# Patient Record
Sex: MALE | Race: BLACK OR AFRICAN AMERICAN | URBAN - METROPOLITAN AREA
[De-identification: names, ages, dates, MRNs, and addresses within clinical notes are randomized per-mention and may not be internally consistent; named-entity substitution may affect disease eponyms.]

---

## 2019-06-30 ENCOUNTER — HOSPITAL ENCOUNTER (OUTPATIENT)
Dept: MEDSURG UNIT | Facility: HOSPITAL | Age: 44
End: 2019-07-01
Attending: INTERNAL MEDICINE | Admitting: INTERNAL MEDICINE

## 2019-06-30 LAB
ABS NEUT (OLG): 10.31 X10(3)/MCL (ref 2.1–9.2)
ALBUMIN SERPL-MCNC: 3.1 GM/DL (ref 3.4–5)
ALBUMIN/GLOB SERPL: 0.6 RATIO (ref 1.1–2)
ALP SERPL-CCNC: 124 UNIT/L (ref 45–117)
ALT SERPL-CCNC: 77 UNIT/L (ref 12–78)
AST SERPL-CCNC: 66 UNIT/L (ref 15–37)
BASOPHILS # BLD AUTO: 0.02 X10(3)/MCL
BASOPHILS NFR BLD AUTO: 0 %
BILIRUB SERPL-MCNC: 0.8 MG/DL (ref 0.2–1)
BILIRUBIN DIRECT+TOT PNL SERPL-MCNC: 0.3 MG/DL
BILIRUBIN DIRECT+TOT PNL SERPL-MCNC: 0.5 MG/DL
BUN SERPL-MCNC: 7 MG/DL (ref 7–18)
CALCIUM SERPL-MCNC: 9.9 MG/DL (ref 8.5–10.1)
CHLORIDE SERPL-SCNC: 94 MMOL/L (ref 98–107)
CO2 SERPL-SCNC: 28 MMOL/L (ref 21–32)
CREAT SERPL-MCNC: 0.8 MG/DL (ref 0.6–1.3)
EOSINOPHIL # BLD AUTO: 0.16 X10(3)/MCL
EOSINOPHIL NFR BLD AUTO: 1 %
ERYTHROCYTE [DISTWIDTH] IN BLOOD BY AUTOMATED COUNT: 12 % (ref 11.5–14.5)
GLOBULIN SER-MCNC: 5.5 GM/ML (ref 2.3–3.5)
GLUCOSE SERPL-MCNC: 114 MG/DL (ref 74–106)
GROUP & RH: NORMAL
HCT VFR BLD AUTO: 41.2 % (ref 40–51)
HGB BLD-MCNC: 14 GM/DL (ref 13.5–17.5)
IMM GRANULOCYTES # BLD AUTO: 0.04 10*3/UL
IMM GRANULOCYTES NFR BLD AUTO: 0 %
LYMPHOCYTES # BLD AUTO: 1.07 X10(3)/MCL
LYMPHOCYTES NFR BLD AUTO: 8 % (ref 13–40)
MAGNESIUM SERPL-MCNC: 2 MG/DL (ref 1.6–2.6)
MCH RBC QN AUTO: 34.1 PG (ref 26–34)
MCHC RBC AUTO-ENTMCNC: 34 GM/DL (ref 31–37)
MCV RBC AUTO: 100.5 FL (ref 80–100)
MONOCYTES # BLD AUTO: 1.01 X10(3)/MCL
MONOCYTES NFR BLD AUTO: 8 % (ref 4–12)
NEUTROPHILS # BLD AUTO: 10.31 X10(3)/MCL
NEUTROPHILS NFR BLD AUTO: 82 X10(3)/MCL
PLATELET # BLD AUTO: 263 X10(3)/MCL (ref 130–400)
PMV BLD AUTO: 10.7 FL (ref 7.4–10.4)
POTASSIUM SERPL-SCNC: 3.1 MMOL/L (ref 3.5–5.1)
PROT SERPL-MCNC: 8.6 GM/DL (ref 6.4–8.2)
RBC # BLD AUTO: 4.1 X10(6)/MCL (ref 4.5–5.9)
SODIUM SERPL-SCNC: 130 MMOL/L (ref 136–145)
WBC # SPEC AUTO: 12.6 X10(3)/MCL (ref 4.5–11)

## 2019-07-01 LAB
ABS NEUT (OLG): 11.9 X10(3)/MCL (ref 2.1–9.2)
ALBUMIN SERPL-MCNC: 3.3 GM/DL (ref 3.4–5)
ALBUMIN/GLOB SERPL: 0.6 RATIO (ref 1.1–2)
ALP SERPL-CCNC: 140 UNIT/L (ref 45–117)
ALT SERPL-CCNC: 92 UNIT/L (ref 12–78)
AST SERPL-CCNC: 100 UNIT/L (ref 15–37)
BASOPHILS # BLD AUTO: 0.01 X10(3)/MCL
BASOPHILS NFR BLD AUTO: 0 %
BILIRUB SERPL-MCNC: 0.5 MG/DL (ref 0.2–1)
BILIRUBIN DIRECT+TOT PNL SERPL-MCNC: 0.2 MG/DL
BILIRUBIN DIRECT+TOT PNL SERPL-MCNC: 0.3 MG/DL
BUN SERPL-MCNC: 6 MG/DL (ref 7–18)
CALCIUM SERPL-MCNC: 10.1 MG/DL (ref 8.5–10.1)
CHLORIDE SERPL-SCNC: 97 MMOL/L (ref 98–107)
CO2 SERPL-SCNC: 27 MMOL/L (ref 21–32)
CREAT SERPL-MCNC: 0.8 MG/DL (ref 0.6–1.3)
EOSINOPHIL # BLD AUTO: 0.01 10*3/UL
EOSINOPHIL NFR BLD AUTO: 0 %
ERYTHROCYTE [DISTWIDTH] IN BLOOD BY AUTOMATED COUNT: 12.1 % (ref 11.5–14.5)
GLOBULIN SER-MCNC: 5.4 GM/ML (ref 2.3–3.5)
GLUCOSE SERPL-MCNC: 144 MG/DL (ref 74–106)
HCT VFR BLD AUTO: 37.4 % (ref 40–51)
HGB BLD-MCNC: 12.6 GM/DL (ref 13.5–17.5)
IMM GRANULOCYTES # BLD AUTO: 0.07 10*3/UL
IMM GRANULOCYTES NFR BLD AUTO: 0 %
LYMPHOCYTES # BLD AUTO: 0.96 X10(3)/MCL
LYMPHOCYTES NFR BLD AUTO: 7 % (ref 13–40)
MCH RBC QN AUTO: 33.9 PG (ref 26–34)
MCHC RBC AUTO-ENTMCNC: 33.7 GM/DL (ref 31–37)
MCV RBC AUTO: 100.5 FL (ref 80–100)
MONOCYTES # BLD AUTO: 1 X10(3)/MCL
MONOCYTES NFR BLD AUTO: 7 % (ref 4–12)
NEUTROPHILS # BLD AUTO: 11.9 X10(3)/MCL
NEUTROPHILS NFR BLD AUTO: 85 X10(3)/MCL
PLATELET # BLD AUTO: 396 X10(3)/MCL (ref 130–400)
PMV BLD AUTO: 9.5 FL (ref 7.4–10.4)
POTASSIUM SERPL-SCNC: 4 MMOL/L (ref 3.5–5.1)
PROT SERPL-MCNC: 8.7 GM/DL (ref 6.4–8.2)
RBC # BLD AUTO: 3.72 X10(6)/MCL (ref 4.5–5.9)
SODIUM SERPL-SCNC: 132 MMOL/L (ref 136–145)
WBC # SPEC AUTO: 14 X10(3)/MCL (ref 4.5–11)

## 2022-04-30 NOTE — H&P
Patient:   Demetri Villalobos             MRN: [id number]            FIN: [id number]               Age:   43 years     Sex:  Male     :  1975   Associated Diagnoses:   None   Author:   Saturnino Vogel MD      Chief Complaint   2019 13:04 CDT      started lisinopril and amlodipine today, took a nap, woke up approx 11am, lower lip swelling no trouble breathing. no tongue swelling. NAD.      History of Present Illness   43-year-old male with past medical history of hypertension and recent hospitalization for first episode of acute alcoholic pancreatitis who was discharged home yesterday.  Was discharged on Bentyl, amlodipine 10 mg, lisinopril 40 mg, metoprolol succinate 50 mg.  Patient reports that he was asymptomatic and doing well this AM.  Took the amlodipine and lisinopril this AM.  Took a nap thereafter and woke up with significant lower lip swelling around 1100.  Called the hospital and was told to report to the ED for further evaluation.  In the ED noted to have angioedema of the lower lip and face.  Received Benadryl 50 mg IV, epinephrine 0.3 mg IM, Solu-Medrol 125 mg IV, and 1 L bolus of normal saline, with continued progression of his lip swelling.  Then received 2 units of FFP with stabilization and improvement of the swelling.  Medicine on-call was consulted for admission.      Review of Systems   Constitutional:  No fever, No chills, No fatigue.    Eye:  No recent visual problem, No blurring, No double vision.    Ear/Nose/Mouth/Throat:  No nasal congestion, No sore throat, No tinnitus.    Respiratory:  No shortness of breath, No cough, No wheezing.    Cardiovascular:  No chest pain, No palpitations, No peripheral edema, No syncope.    Gastrointestinal:  No nausea, No vomiting, No diarrhea, No constipation.    Genitourinary:  No dysuria, No hematuria.    Musculoskeletal:  No joint pain, No muscle pain.    Integumentary:  No rash, No skin lesion.    Neurologic:  No numbness, No tingling, No  headache.       Health Status   PMHx: Hypertension, alcoholic pancreatitis  Surgical Hx: Right fifth digit cyst excision  Family Hx: Reports both parents living and healthy.  Denies family history of cancers.  Social Hx: 1/3 pack/day smoker since age 20; previously drank 1 pint vodka 3-4 times per week; denies illicit drug use      Allergies: NKDA   Current medications:   Amlodipine 10 mg daily  Lisinopril 40 mg daily  Metoprolol succinate 50 mg daily  Bentyl 10 mg 4 times daily      Physical Examination      Vital Signs (last 24 hrs)_____  Last Charted___________  Temp Oral     37.0 DegC  (JUN 30 16:03)  Heart Rate Peripheral   H 116bpm  (JUN 30 14:00)  Resp Rate         20   (JUN 30 16:03)  SBP      H 167  (JUN 30 16:03)  DBP      H 115  (JUN 30 16:03)  SpO2      97 %  (JUN 30 16:03)  Weight      59.05 kg  (JUN 30 13:04)     GENERAL: Sitting comfortably in bed in no apparent distress  EYES: EOMI, PERRL, normal conjunctivae  HENT: Oral mucosa moist, Mallampati IV, moderate angioedema of the lower lip and buccal mucosa; no visible pharyngeal edema  NECK: Supple, no JVD, no lymphadenopathy  CHEST: CTAB, nonlabored respirations  CARDIOVASCULAR:  Regular rate and rhythm, S1, S2, grade II/VI systolic ejection murmur left upper sternal border, no carotid bruits  ABDOMEN:  Soft, nontender, nondistended, normal bowel sounds  EXTREMITIES: No clubbing, cyanosis, or edema.  Peripheral pulses normal and equal in all extremities.  NEURO: Awake, alert, oriented, no focal deficits  SKIN: Warm, dry, intact, no rashes      Review / Management   Laboratory Results   Today's Lab Results : [facility] Discrete Results   6/30/2019 13:25 CDT      WBC                       12.6 x10(3)/mcL  HI                             RBC                       4.10 x10(6)/mcL  LOW                             Hgb                       14.0 gm/dL                             Hct                       41.2 %                             Platelet                   263 x10(3)/mcL                             MCV                       100.5 fL  HI                             MCH                       34.1 pg  HI                             MCHC                      34.0 gm/dL                             RDW                       12.0 %                             MPV                       10.7 fL  HI                             Abs Neut                  10.31 x10(3)/mcL  HI                             Neutro Auto               82 x10(3)/mcL  NA                             Lymph Auto                8 %  LOW                             Mono Auto                 8 %                             Eos Auto                  1  NA                             Abs Eos                   0.16 x10(3)/mcL  NA                             Basophil Auto             0  NA                             Abs Neutro                10.31 x10(3)/mcL  NA                             Abs Lymph                 1.07 x10(3)/mcL  NA                             Abs Mono                  1.01 x10(3)/mcL  NA                             Abs Baso                  0.02 x10(3)/mcL  NA                             IG%                       0 %  NA                             IG#                       0.0400  NA                             Sodium Lvl                130 mmol/L  LOW                             Potassium Lvl             3.1 mmol/L  LOW                             Chloride                  94 mmol/L  LOW                             CO2                       28 mmol/L                             Calcium Lvl               9.9 mg/dL                             Glucose Lvl               114 mg/dL  HI                             BUN                       7 mg/dL                             Creatinine                0.80 mg/dL                             eGFR-AA                   >105 mL/min                             eGFR-CHANTALE                  >105 mL/min                             Bili Total                 0.8 mg/dL                             Bili Direct               0.3 mg/dL                             Bili Indirect             0.5 mg/dL                             AST                       66 unit/L  HI                             ALT                       77 unit/L                             Alk Phos                  124 unit/L  HI                             Total Protein             8.6 gm/dL  HI                             Albumin Lvl               3.1 gm/dL  LOW                             Globulin                  5.50 gm/mL  HI                             A/G Ratio                 0.6 ratio  LOW        Radiology results   Rad Results (ST)   Accession: [id number]  Order: XR Chest 2 Views  Report Dt/Tm: 06/30/2019 14:02  Report:      History:  Dyspnea     Reference:  No priors     Findings:  Frontal and lateral views of the chest were obtained. Heart and  mediastinum within normal limits. The lungs are clear. No pneumothorax  or significant effusion.     Impression:   No acute cardiopulmonary abnormality.      Impression and Plan   Acute ACEi induced angioedema of the lower lip  Uncontrolled hypertension  Hypokalemia  Systolic heart murmur  Recent acute alcoholic pancreatitis    Plan:  -Status post FFP, improving, continue close monitoring on telemetry; low threshold for intubation  -NPO for now, will control BP with IV PRN labetalol  -Adding ACE allergy to his chart  -Replete potassium, check mag level  -Echocardiogram in AM for newly noted cardiac murmur  -Hypertension is chronic, tachycardia appears chronic as well and likely worsened due to epinephrine; no suspicion for acute alcohol withdrawals    DVT ppx: SCDs  Code status: full code    Dispo: Admit observation to telemetry overnight.  Close monitoring of his airway.  No ACE in the future.  Counseled on risk of recurrence in the next few weeks even without ACE.

## 2022-04-30 NOTE — ED PROVIDER NOTES
Patient:   Demetri Villalobos             MRN: [id number]            FIN: [id number]               Age:   43 years     Sex:  Male     :  1975   Associated Diagnoses:   Angioedema; Allergy to lisinopril   Author:   Sj Guerra MD      Basic Information   Time seen: Date & time 2019 13:25:00.   History source: Patient.   Arrival mode: Private vehicle.   History limitation: None.   Additional information: Chief Complaint from Nursing Triage Note : Chief Complaint   2019 13:04 CDT      Chief Complaint           started lisinopril and amlodipine today, took a nap, woke up approx 11am, lower lip swelling no trouble breathing. no tongue swelling. NAD.  .   Provider/Visit info:   Time Seen:  Sj Guerra MD / 2019 13:19  .   History of Present Illness   The patient presents with face swelling.  The onset was just prior to arrival.  The course/duration of symptoms is worsening.  Location: lips. The character of symptoms is swelling.  The degree at onset was minimal.  The degree at present is moderate.  Risk factors consist of recent medication change and started lisinopril today.  Prior episodes: none.  Associated symptoms: swelling, denies dyspnea, denies wheezing and denies difficulty swallowing.        Review of Systems   Constitutional symptoms:  No fever, no chills, no sweats.    Skin symptoms:  No rash,    Eye symptoms:  No recent vision problems,    ENMT symptoms:  Negative except as documented in HPI.   Respiratory symptoms:  No shortness of breath, no cough.    Cardiovascular symptoms:  No chest pain, no tachycardia.    Gastrointestinal symptoms:  No abdominal pain, no nausea, no vomiting.    Genitourinary symptoms:  No dysuria,    Musculoskeletal symptoms:  No back pain, no Muscle pain.    Neurologic symptoms:  No headache, no dizziness.              Additional review of systems information: All other systems reviewed and otherwise negative.      Health Status   Allergies:     Allergic Reactions (Selected)  No Known Medication Allergies,    Allergies (1) Active Reaction  No Known Medication Allergies None Documented  .   Medications:  (Selected)   Inpatient Medications  Ordered  Benadryl (for Inj.): 50 mg, form: Injection, IV Push, Once-chemo, first dose 06/30/19 13:23:00 CDT, stop date 06/30/19 13:23:00 CDT, STAT  EPINEPHrine 0.1 mg/mL injectable solution: 0.3 mg, form: Injection, IM, Once, first dose 06/30/19 13:23:00 CDT, stop date 06/30/19 13:23:00 CDT, STAT  Normal Saline Infusion: 1,000 mL, 1,000 mL, IV, 2000 mL/hr, start date 06/30/19 13:25:00 CDT, stop date 06/30/19 13:25:00 CDT, STAT  Solumedrol IV push / IM: 125 mg, form: Injection, IV Push, Once, first dose 06/30/19 13:24:00 CDT, stop date 06/30/19 13:24:00 CDT, STAT  morphine 2 mg/mL injectable solution: 4 mg, form: Soln, IV, q3hr PRN for pain, severe, first dose 06/30/19 13:25:00 CDT, STAT  Prescriptions  Prescribed  Bentyl 10 mg oral capsule: 10 mg = 1 cap(s), Oral, QID, # 40 cap(s), 0 Refill(s)  Norvasc 10 mg oral tablet: 10 mg = 1 tab(s), Oral, Daily, # 90 tab(s), 0 Refill(s)  lisinopril 40 mg oral tablet: 40 mg = 1 tab(s), Oral, Daily, # 90 tab(s), 0 Refill(s)  metoprolol succinate 50 mg oral tablet, extended release: 50 mg = 1 tab(s), Oral, Daily, # 90 tab(s), 0 Refill(s).      Past Medical/ Family/ Social History   Medical history:    No active or resolved past medical history items have been selected or recorded..   Surgical history:    Finger injury ([id number])..   Family history:    No family history items have been selected or recorded..      Physical Examination               Vital Signs   Vital Signs   6/30/2019 13:04 CDT      Temperature Oral          36.8 DegC                             Temperature Oral (calculated)             98.24 DegF                             Peripheral Pulse Rate     108 bpm  HI                             Respiratory Rate          18 br/min                             SpO2                       100 %                             Systolic Blood Pressure   149 mmHg  HI                             Diastolic Blood Pressure  104 mmHg  HI  .   General:  Alert, no acute distress.    Skin:  Warm, dry.    Head:  Normocephalic.   Neck:  Supple, trachea midline, no tenderness, no JVD.    Eye:  Pupils are equal, round and reactive to light, extraocular movements are intact.    Ears, nose, mouth and throat:  Oral mucosa moist, no pharyngeal erythema or exudate, bilateral severe lower lip swelling..    Cardiovascular:  Regular rate and rhythm, No murmur, Normal peripheral perfusion, No edema.    Respiratory:  Lungs are clear to auscultation, respirations are non-labored, breath sounds are equal, Symmetrical chest wall expansion.    Gastrointestinal:  Soft, Nontender, Non distended, Normal bowel sounds, No organomegaly.    Back:  Nontender, Normal range of motion.    Musculoskeletal:  Normal ROM, normal strength, no tenderness, no swelling, no deformity.    Neurological:  Alert and oriented to person, place, time, and situation, No focal neurological deficit observed, CN II-XII intact, normal sensory observed, normal motor observed, normal speech observed, normal coordination observed.    Psychiatric:  Cooperative.      Medical Decision Making   Differential Diagnosis:  Angioedema.   Documents reviewed:  Emergency department nurses' notes.   Results review:  Lab results : Lab View   6/30/2019 14:06 CDT      Product Ready             2 FFP ready    6/30/2019 13:50 CDT      ABO/Rh                    B POS                             ABSC Auto Intrp           Neg    6/30/2019 13:25 CDT      Sodium Lvl                130 mmol/L  LOW                             Potassium Lvl             3.1 mmol/L  LOW                             Chloride                  94 mmol/L  LOW                             CO2                       28 mmol/L                             Calcium Lvl               9.9 mg/dL                              Glucose Lvl               114 mg/dL  HI                             BUN                       7 mg/dL                             Creatinine                0.80 mg/dL                             eGFR-AA                   >105 mL/min                             eGFR-CHANTALE                  >105 mL/min                             Bili Total                0.8 mg/dL                             Bili Direct               0.3 mg/dL                             Bili Indirect             0.5 mg/dL                             AST                       66 unit/L  HI                             ALT                       77 unit/L                             Alk Phos                  124 unit/L  HI                             Total Protein             8.6 gm/dL  HI                             Albumin Lvl               3.1 gm/dL  LOW                             Globulin                  5.50 gm/mL  HI                             A/G Ratio                 0.6 ratio  LOW                             WBC                       12.6 x10(3)/mcL  HI                             RBC                       4.10 x10(6)/mcL  LOW                             Hgb                       14.0 gm/dL                             Hct                       41.2 %                             Platelet                  263 x10(3)/mcL                             MCV                       100.5 fL  HI                             MCH                       34.1 pg  HI                             MCHC                      34.0 gm/dL                             RDW                       12.0 %                             MPV                       10.7 fL  HI                             Abs Neut                  10.31 x10(3)/mcL  HI                             Neutro Auto               82 x10(3)/mcL  NA                             Lymph Auto                8 %  LOW                             Mono Auto                 8 %                             Eos Auto                   1  NA                             Abs Eos                   0.16 x10(3)/mcL  NA                             Basophil Auto             0  NA                             Abs Neutro                10.31 x10(3)/mcL  NA                             Abs Lymph                 1.07 x10(3)/mcL  NA                             Abs Mono                  1.01 x10(3)/mcL  NA                             Abs Baso                  0.02 x10(3)/mcL  NA                             IG%                       0 %  NA                             IG#                       0.0400  NA    6/29/2019 3:42 CDT       Sodium Lvl                133 mmol/L  LOW                             Potassium Lvl             3.9 mmol/L                             Chloride                  98 mmol/L                             CO2                       29 mmol/L                             Calcium Lvl               8.9 mg/dL                             Glucose Lvl               98 mg/dL                             BUN                       5 mg/dL  LOW                             Creatinine                0.80 mg/dL                             eGFR-AA                   >105 mL/min                             eGFR-CHANTALE                  >105 mL/min                             Bili Total                0.8 mg/dL                             Bili Direct               0.4 mg/dL  HI                             Bili Indirect             0.4 mg/dL                             AST                       92 unit/L  HI                             ALT                       76 unit/L                             Alk Phos                  112 unit/L                             Total Protein             6.8 gm/dL                             Albumin Lvl               2.5 gm/dL  LOW                             Globulin                  4.30 gm/mL  HI                             A/G Ratio                 0.6 ratio  LOW                             WBC                        13.8 x10(3)/mcL  HI                             RBC                       3.82 x10(6)/mcL  LOW                             Hgb                       12.9 gm/dL  LOW                             Hct                       38.2 %  LOW                             Platelet                  242 x10(3)/mcL                             MCV                       100.0 fL                             MCH                       33.8 pg                             MCHC                      33.8 gm/dL                             RDW                       12.9 %                             MPV                       9.9 fL                             Abs Neut                  11.65 x10(3)/mcL  HI                             Neutro Auto               84 x10(3)/mcL  NA                             Lymph Auto                7 %  LOW                             Mono Auto                 8 %                             Eos Auto                  0  NA                             Abs Eos                   0.07  NA                             Basophil Auto             0  NA                             Abs Neutro                11.65 x10(3)/mcL  NA                             Abs Lymph                 0.99 x10(3)/mcL  NA                             Abs Mono                  1.03 x10(3)/mcL  NA                             Abs Baso                  0.02 x10(3)/mcL  NA                             IG%                       0 %  NA                             IG#                       0.0500  NA    .      Reexamination/ Reevaluation   Vital signs   results included from flowsheet : Vital Signs   6/30/2019 14:53 CDT      Temperature Oral          37.2 DegC                             Temperature Oral (calculated)             98.96 DegF                             Heart Rate Monitored      110  HI                             Respiratory Rate          20                             SpO2                      98 %                             Saturation  Probe Site     Hand, right                             Oxygen Therapy            Room air                             Systolic Blood Pressure   157  HI                             Diastolic Blood Pressure  109  HI                             Mean Arterial Pressure, Cuff              125 mmHg                             Blood Pressure Location   Left arm    6/30/2019 14:49 CDT      Temperature Oral          37.2 DegC                             Temperature Oral (calculated)             98.96 DegF                             Heart Rate Monitored      110  HI                             Respiratory Rate          20                             SpO2                      100 %                             Saturation Probe Site     Hand, right                             Oxygen Therapy            Room air                             Systolic Blood Pressure   157  HI                             Diastolic Blood Pressure  109  HI                             Mean Arterial Pressure, Cuff              125 mmHg                             Blood Pressure Location   Left arm    6/30/2019 14:00 CDT      Peripheral Pulse Rate     116 bpm  HI                             Heart Rate Monitored      119 bpm  HI                             Respiratory Rate          20 br/min                             SpO2                      100 %                             Saturation Probe Site     Hand, right                             Oxygen Therapy            Room air                             Systolic Blood Pressure   162 mmHg  HI                             Diastolic Blood Pressure  111 mmHg  HI                             Mean Arterial Pressure, Cuff              128 mmHg                             Blood Pressure Location   Left arm    6/30/2019 13:40 CDT      Peripheral Pulse Rate     121 bpm  HI                             Heart Rate Monitored      121 bpm  HI                             Respiratory Rate          21 br/min                              SpO2                      98 %                             Saturation Probe Site     Hand, right                             Oxygen Therapy            Room air                             Systolic Blood Pressure   152 mmHg  HI                             Diastolic Blood Pressure  113 mmHg  HI                             Mean Arterial Pressure, Cuff              126 mmHg                             Blood Pressure Location   Left arm    6/30/2019 13:30 CDT      Peripheral Pulse Rate     99 bpm                             Respiratory Rate          18 br/min                             SpO2                      100 %                             Saturation Probe Site     Hand, right                             Oxygen Therapy            Room air                             Systolic Blood Pressure   160 mmHg  HI                             Diastolic Blood Pressure  121 mmHg  HI                             Mean Arterial Pressure, Cuff              134 mmHg                             Blood Pressure Location   Left arm    6/30/2019 13:16 CDT      Peripheral Pulse Rate     110 bpm  HI                             Respiratory Rate          20 br/min                             SpO2                      100 %                             Saturation Probe Site     Hand, right                             Oxygen Therapy            Room air                             Systolic Blood Pressure   140 mmHg                             Diastolic Blood Pressure  112 mmHg  HI                             Mean Arterial Pressure, Cuff              121 mmHg                             Blood Pressure Location   Left arm    6/30/2019 13:04 CDT      Temperature Oral          36.8 DegC                             Temperature Oral (calculated)             98.24 DegF                             Peripheral Pulse Rate     108 bpm  HI                             Respiratory Rate          18 br/min                             SpO2                       100 %                             Systolic Blood Pressure   149 mmHg  HI                             Diastolic Blood Pressure  104 mmHg  HI     Notes: no response to epi, benadryl, solumedrol, increase lower lip swelling, no involvment of tongue, palate or pharynx.  will give FFP due to increased swelling, admit for obs..      Procedure   Critical care note   Total time: 30 minutes spent engaged in work directly related to patient care and/ or available for direct patient care.   Critical condition(s) addressed for impending deterioration include: respiratory.   Associated risk factors: angioedema.   Management: bedside assessment, supervision of care, Interpretation (electrocardiogram, blood pressure), Interventions plasma transfusion.   Performed by: self.      Impression and Plan   Diagnosis   Angioedema ([id number] T78.3XXA)   Allergy to lisinopril ([id number] Z88.8)      Calls-Consults   -  6/30/2019 15:01:00 , Van PEREZ, Leno ROSALES, IM, consult.    Plan   Condition: Guarded.    Disposition: Admit to Inpatient Telemetry Unit.

## 2022-05-04 NOTE — HISTORICAL [facility]ER
This is a historical note converted from Jv. Formatting and pictures may have been removed.  Please reference Jv for original formatting and attached multimedia. Admit and Discharge Dates  Admit Date: 06/30/2019  Discharge Date: 07/01/2019  Physicians  Attending Physician - Narciso PEREZ, Sherry FUENTES  Admitting Physician - Narciso PEREZ, Sherry FUENTES  Primary Care Physician - No PCP, No  Discharge Diagnosis  Allergy to lisinopril?Z88.8  ?  Angioedema?T78.3XXA  ?  Hypertension?I10  ?  Lip swelling?[id number]  ?  Surgical Procedures  No procedures recorded for this visit.  Immunizations  No immunizations recorded for this visit.  Hospital Course  43-year-old male with past medical history of hypertension and recent hospitalization for first episode of acute alcoholic pancreatitis who was discharged home 6/29. ?Was discharged on Bentyl, amlodipine 10 mg, lisinopril 40 mg, metoprolol succinate 50 mg. ?Patient reports that he was asymptomatic and doing well 6/30 am. ?Took the amlodipine and lisinopril that AM. ?Took a nap thereafter and woke up with significant lower lip swelling around 1100. ?Called the hospital and was told to report to the ED for further evaluation. ?In the ED noted to have angioedema of the lower lip and face. ?Received Benadryl 50 mg IV, epinephrine 0.3 mg IM, Solu-Medrol 125 mg IV, and 1 L bolus of normal saline, with continued progression of his lip swelling. ?Then received 2 units of FFP with stabilization and improvement of the swelling. ?Medicine on-call was consulted for admission.  ?   Lisinopril was held and he had resolution of his lip swelling.? On hospital day 2?he was symptomatically improved.? He initially had some tachycardia which resolved with restarting his metoprolol. ?His blood pressure remained?elevated in the 160s to 170s systolic for which spironolactone 25 mg daily was added with improvement in his blood pressure.? This afternoon he?denies any  complaints. ?He is requesting to be discharged home. ?Deemed stable for discharge at this time with follow-up in IM clinic to establish care in 1 week with repeat BMP at that time?for starting spironolactone. ?Extensive ED precautions given. ?Alcohol cessation is encouraged.? Voiced understanding.?  ?  Time Spent on discharge  >40 minutes  Objective  Vitals & Measurements  T:?37.1? ?C (Oral)? TMIN:?37.0? ?C (Oral)? TMAX:?37.4? ?C (Oral)? HR:?88(Peripheral)? RR:?22? BP:?154/108? SpO2:?99%? WT:?59.05?kg?  Physical Exam  General: ?Alert and oriented, No acute distress. ?  Eye: ?Extraocular movements are intact. ?  HENT: ?Normocephalic. ?No lip edema or face edema  Neck: ?Supple. ?  Respiratory: ?CTA b/l, Respirations are non-labored, Symmetrical chest wall expansion. ?  Cardiovascular: RRR, S1/S2, no murmur  Gastrointestinal: non distended  Musculoskeletal: ?No swelling, No deformity. ?  Integumentary: ?Warm, Dry. ?  Neurologic: ?Alert. ?  Cognition and Speech: ?Speech clear and coherent. ?  Psychiatric: ?Cooperative, Appropriate mood & affect. ?  ?  Patient Discharge Condition  Stable, improved  Discharge Disposition  Continue amlodipine 10 mg daily, metoprolol 50 mg XR daily, spironolactone 25 mg daily.? Discontinue lisinopril.? Follow-up with internal medicine clinic to establish care in 1 week with repeat BMP.   Discharge Medication Reconciliation  Continue  amLODIPine (Norvasc 10 mg oral tablet)?10 mg, Oral, Daily  dicyclomine (Bentyl 10 mg oral capsule)?10 mg, Oral, QID  metoprolol (metoprolol succinate 50 mg oral tablet, extended release)?50 mg, Oral, Daily  metoprolol (metoprolol succinate 50 mg oral tablet, extended release)?50 mg, Oral, Daily  spironolactone (spironolactone 25 mg oral tablet)?25 mg, Oral, Daily  Discontinue  lisinopril (lisinopril 40 mg oral tablet)?40 mg, Oral, Daily  Education and Orders Provided  Angioedema  Discharge - 07/01/19 14:37:00 CDT, Home, pending meds to bed  spironolactone?  Follow up  [facility], within 1 week  ????with repeat bmp for starting spironolactone      Agree with discharge plan as documented.